# Patient Record
Sex: MALE | Race: BLACK OR AFRICAN AMERICAN | URBAN - METROPOLITAN AREA
[De-identification: names, ages, dates, MRNs, and addresses within clinical notes are randomized per-mention and may not be internally consistent; named-entity substitution may affect disease eponyms.]

---

## 2020-12-17 ENCOUNTER — HISTORICAL (OUTPATIENT)
Dept: ADMINISTRATIVE | Facility: HOSPITAL | Age: 30
End: 2020-12-17

## 2020-12-17 LAB
ABS NEUT (OLG): 7.14 X10(3)/MCL (ref 2.1–9.2)
ALBUMIN SERPL-MCNC: 4.3 GM/DL (ref 3.5–5)
ALBUMIN/GLOB SERPL: 1.2 RATIO (ref 1.1–2)
ALP SERPL-CCNC: 128 UNIT/L (ref 40–150)
ALT SERPL-CCNC: 38 UNIT/L (ref 0–55)
AST SERPL-CCNC: 27 UNIT/L (ref 5–34)
BASOPHILS # BLD AUTO: 0.03 X10(3)/MCL (ref 0–0.2)
BASOPHILS NFR BLD AUTO: 0.3 % (ref 0–0.9)
BILIRUB SERPL-MCNC: 0.4 MG/DL (ref 0.2–1.2)
BILIRUBIN DIRECT+TOT PNL SERPL-MCNC: 0.2 MG/DL (ref 0–0.5)
BILIRUBIN DIRECT+TOT PNL SERPL-MCNC: 0.2 MG/DL (ref 0–0.8)
BUN SERPL-MCNC: 13.5 MG/DL (ref 8.9–20.6)
CALCIUM SERPL-MCNC: 9.7 MG/DL (ref 8.4–10.2)
CHLORIDE SERPL-SCNC: 102 MMOL/L (ref 98–107)
CO2 SERPL-SCNC: 22 MMOL/L (ref 22–29)
CREAT SERPL-MCNC: 1.06 MG/DL (ref 0.72–1.25)
EOSINOPHIL # BLD AUTO: 0.04 X10(3)/MCL (ref 0–0.9)
EOSINOPHIL NFR BLD AUTO: 0.5 % (ref 0–6.5)
ERYTHROCYTE [DISTWIDTH] IN BLOOD BY AUTOMATED COUNT: 13.9 % (ref 11.5–17)
GLOBULIN SER-MCNC: 3.7 GM/DL (ref 2.4–3.5)
GLUCOSE SERPL-MCNC: 116 MG/DL (ref 74–100)
HCT VFR BLD AUTO: 46.2 % (ref 42–52)
HGB BLD-MCNC: 15.2 GM/DL (ref 14–18)
IMM GRANULOCYTES # BLD AUTO: 0.03 10*3/UL (ref 0–0.02)
IMM GRANULOCYTES NFR BLD AUTO: 0.3 % (ref 0–0.43)
LYMPHOCYTES # BLD AUTO: 1.13 X10(3)/MCL (ref 0.6–4.6)
LYMPHOCYTES NFR BLD AUTO: 12.7 % (ref 16.2–38.3)
MCH RBC QN AUTO: 26.9 PG (ref 27–31)
MCHC RBC AUTO-ENTMCNC: 32.9 GM/DL (ref 33–36)
MCV RBC AUTO: 81.6 FL (ref 80–94)
MONOCYTES # BLD AUTO: 0.5 X10(3)/MCL (ref 0.1–1.3)
MONOCYTES NFR BLD AUTO: 5.6 % (ref 4.7–11.3)
NEUTROPHILS # BLD AUTO: 7.14 X10(3)/MCL (ref 2.1–9.2)
NEUTROPHILS NFR BLD AUTO: 80.6 % (ref 49.1–73.4)
NRBC BLD AUTO-RTO: 0 % (ref 0–0.2)
PLATELET # BLD AUTO: 237 X10(3)/MCL (ref 130–400)
PMV BLD AUTO: 10.1 FL (ref 7.4–10.4)
POTASSIUM SERPL-SCNC: 4 MMOL/L (ref 3.5–5.1)
PROT SERPL-MCNC: 8 GM/DL (ref 6.4–8.3)
RBC # BLD AUTO: 5.66 X10(6)/MCL (ref 4.7–6.1)
SODIUM SERPL-SCNC: 136 MMOL/L (ref 136–145)
WBC # SPEC AUTO: 8.9 X10(3)/MCL (ref 4.5–11.5)

## 2022-04-30 NOTE — H&P
Date of admission 12/17/2020.    CHIEF COMPLAINT:  Testicular torsion.    HISTORY OF PRESENT ILLNESS:  This is a 30-year-old  male who is complaining of right testicular pain starting at 10:30 a.m.  He had an enlarged, high-riding testicle in the ER.  He had ultrasound showing no flow to the right testicle, consistent with torsion.  He has no previous history of such.  He was also having some difficulty urinating and some abdominal pain, which resolved.  Urology was consulted.    REVIEW OF SYSTEMS:  A 12-point review of systems is negative other than the HPI.    ALLERGIES:  No known drug allergies.    MEDICATIONS:  Medication administration record is well-documented in the chart and reviewed.    PAST MEDICAL HISTORY:  Pilonidal cyst.    PAST SURGICAL HISTORY:  None.    FAMILY HISTORY:  No  malignancy or stones.    SOCIAL HISTORY:  No tobacco, alcohol, or drugs currently.    PHYSICAL EXAMINATION:  VITAL SIGNS:  Currently his vitals are stable.  He is afebrile.   GENERAL:  Well-developed, well-nourished, in no acute distress.   CARDIOVASCULAR:  Regular rate and rhythm.   HEENT:  Normocephalic, atraumatic.   LUNGS:  Respirations unlabored.   ABDOMEN:  Obese, soft, nontender, nondistended.   :  A high-riding, enlarged, tender right testicle.  No discharge.  The phallus is normal.  Left testicle palpably normal.  No lymphadenopathy.   EXTREMITIES:  No cyanosis, clubbing, edema.   NEUROLOGIC:  Awake, alert, and oriented x4.    IMAGING:  Per HPI.    LABS:  Electrolytes are stable.  Creatinine 1.06.  Hemoglobin and hematocrit 15 and 46, white count 8.9.    ASSESSMENT:  This is a 30-year-old male with acute right testicular torsion.    PLAN:  We will take to the OR emergently for scrotal exploration, bilateral orchiopexy versus right orchiectomy.  All risks, benefits, and treatment alternatives were discussed and well-informed consent was obtained.  He will be discharged home  following.        ______________________________  Ramon SIMENTAL [place], MD NEAL/US  DD:  12/17/2020  Time:  05:56PM  DT:  12/17/2020  Time:  06:06PM  Job #:  [id number]    The H&P was reviewed, the patient was examined, and the following changes to the patients condition are noted:  ______________________________________________________________________________  ______________________________________________________________________________  ______________________________________________________________________________  [  ] No changes to the patient's condition:      ______________________________                                             ___________________  PHYSICIAN SIGNATURE                                                             DATE/TIME

## 2022-04-30 NOTE — ED PROVIDER NOTES
Patient:   Ike Gallegos            MRN: [id number]            FIN: [id number]               Age:   30 years     Sex:  Male     :  1990   Associated Diagnoses:   Right testicular torsion   Author:   Placido Logan MD      Basic Information   History source: Patient.   Arrival mode: Private vehicle.   History limitation: None.   Additional information: Chief Complaint from Nursing Triage Note : Chief Complaint   2020 12:31 CST     Chief Complaint           c/o lower right abd pain radiating to groin since 1030 AM. States has not been able to urinate since pain started. Still has his appendix  .   Provider/Visit info:   Time Seen:  Sonya Ware MD / 2020 12:53  .   History of Present Illness   The patient presents with testicular pain and groin pain.  The onset was 4  hours ago.  The course/duration of symptoms is constant.  Type of injury: none.  Location: Right testicle groin. Radiating pain: right abdomen.  The character of symptoms is throbbing.  The degree at onset was severe.  The degree at present is moderate.  The exacerbating factor is none.  The relieving factor is none.  Risk factors consist of none.  Prior episodes: none.  Therapy today: none.  Associated symptoms: none.        Review of Systems   Constitutional symptoms:  Negative except as documented in HPI.   Skin symptoms:  Negative except as documented in HPI.   Eye symptoms:  Negative except as documented in HPI.   ENMT symptoms:  Negative except as documented in HPI.   Respiratory symptoms:  Negative except as documented in HPI.   Cardiovascular symptoms:  Negative except as documented in HPI.   Gastrointestinal symptoms:  Negative except as documented in HPI.   Genitourinary symptoms:  Negative except as documented in HPI.   Musculoskeletal symptoms:  Negative except as documented in HPI.   Neurologic symptoms:  Negative except as documented in HPI.   Psychiatric symptoms:  Negative except as documented  in HPI.   Endocrine symptoms:  Negative except as documented in HPI.   Hematologic/Lymphatic symptoms:  Negative except as documented in HPI.   Allergy/immunologic symptoms:  Negative except as documented in HPI.             Additional review of systems information: All other systems reviewed and otherwise negative.      Health Status   Allergies:    Allergic Reactions (Selected)  No Known Allergies,    Allergies (1) Active Reaction  No Known Allergies None Documented  .      Past Medical/ Family/ Social History   Medical history:    Resolved  PILONIDAL CYST (685):  Resolved..   Surgical history: Negative.   Family history: Not significant.   Social history:    Social & Psychosocial Habits    Alcohol  03/22/2014 Risk Assessment: Denies Alcohol Use    12/17/2020  Use: Never    Substance Use  12/17/2020  Use: Never    Tobacco  08/27/2012 Risk Assessment: Denies Tobacco Use    12/17/2020  Use: Never (less than 100 in l    Patient Wants Consult For Cessation Counseling N/A    Abuse/Neglect  12/17/2020  SHX Any signs of abuse or neglect No    Feels unsafe at home: No    Safe place to go: Yes  .   Problem list:    Active Problems (2)  Cyst, pilonidal with abscess   Tobacco user   .      Physical Examination               Vital Signs   Vital Signs   12/17/2020 14:15 CST     Peripheral Pulse Rate     78 bpm                             SpO2                      96 %                             Oxygen Therapy            Room air                             Systolic Blood Pressure   123 mmHg                             Diastolic Blood Pressure  67 mmHg                             Mean Arterial Pressure, Cuff              86 mmHg    12/17/2020 14:06 CST     Temperature Oral          36.7 DegC                             Temperature Oral (calculated)             98.06 DegF                             Peripheral Pulse Rate     69 bpm                             Respiratory Rate          16 br/min                             SpO2                       100 %                             Oxygen Therapy            Room air                             Systolic Blood Pressure   139 mmHg                             Diastolic Blood Pressure  82 mmHg    12/17/2020 13:00 CST     Oxygen Therapy            Room air    12/17/2020 12:31 CST     Temperature Oral          36.8 DegC                             Temperature Oral (calculated)             98.24 DegF                             Peripheral Pulse Rate     69 bpm                             Respiratory Rate          20 br/min                             SpO2                      100 %                             Oxygen Therapy            Room air                             Systolic Blood Pressure   114 mmHg                             Diastolic Blood Pressure  64 mmHg  .   Measurements   12/17/2020 12:31 CST     Weight Dosing             102 kg                             Weight Measured and Calculated in Lbs     224.87 lb                             Weight Estimated          102 kg                             Height/Length Dosing      178 cm                             Height/Length Estimated   178 cm                             Body Mass Index Estimated 32.19 kg/m2  .   Basic Oxygen Information   12/17/2020 14:15 CST     SpO2                      96 %                             Oxygen Therapy            Room air    12/17/2020 14:06 CST     SpO2                      100 %                             Oxygen Therapy            Room air    12/17/2020 13:00 CST     Oxygen Therapy            Room air    12/17/2020 12:31 CST     SpO2                      100 %                             Oxygen Therapy            Room air  .   General:  Alert, no acute distress.    Skin:  Warm, dry, pink.    Head:  Normocephalic, atraumatic.    Neck:  Supple, trachea midline, no tenderness, no JVD, no carotid bruit.    Eye:  Pupils are equal, round and reactive to light, extraocular movements are intact, normal  conjunctiva, vision unchanged.    Ears, nose, mouth and throat:  Tympanic membranes clear, oral mucosa moist, no pharyngeal erythema or exudate.    Cardiovascular:  Regular rate and rhythm, No murmur, Normal peripheral perfusion, No edema.    Respiratory:  Lungs are clear to auscultation, respirations are non-labored, breath sounds are equal, Symmetrical chest wall expansion.    Chest wall:  No tenderness, No deformity.    Back:  Nontender, Normal range of motion, Normal alignment.    Musculoskeletal:  Normal ROM, normal strength, no tenderness, no swelling, no deformity.    Gastrointestinal:  Soft, Nontender, Non distended, Normal bowel sounds, No organomegaly.    Genitourinary:  Penis: Glans, shaft, normal, Testes: Right, mild, tenderness, Groin: Right, inguinal, tenderness.    Neurological:  Alert and oriented to person, place, time, and situation, No focal neurological deficit observed, CN II-XII intact, normal sensory observed, normal motor observed, normal speech observed, normal coordination observed.    Lymphatics:  No lymphadenopathy.   Psychiatric:  Cooperative, appropriate mood & affect, normal judgment.       Medical Decision Making   Results review:  Lab results : Lab View   12/17/2020 13:46 CST     Est Creat Clearance Ser   105.48 mL/min    12/17/2020 13:00 CST     Sodium Lvl                136 mmol/L                             Potassium Lvl             4.0 mmol/L                             Chloride                  102 mmol/L                             CO2                       22 mmol/L                             Calcium Lvl               9.7 mg/dL                             Glucose Lvl               116 mg/dL  HI                             BUN                       13.5 mg/dL                             Creatinine                1.06 mg/dL                             eGFR-AA                   >60  NA                             eGFR-NANCY                  >60 mL/min/1.73 m2  NA                              Bili Total                0.4 mg/dL                             Bili Direct               0.2 mg/dL                             Bili Indirect             0.20 mg/dL                             AST                       27 unit/L                             ALT                       38 unit/L                             Alk Phos                  128 unit/L                             Total Protein             8.0 gm/dL                             Albumin Lvl               4.3 gm/dL                             Globulin                  3.7 gm/dL  HI                             A/G Ratio                 1.2 ratio                             WBC                       8.9 x10(3)/mcL                             RBC                       5.66 x10(6)/mcL                             Hgb                       15.2 gm/dL                             Hct                       46.2 %                             Platelet                  237 x10(3)/mcL                             MCV                       81.6 fL                             MCH                       26.9 pg  LOW                             MCHC                      32.9 gm/dL  LOW                             RDW                       13.9 %                             MPV                       10.1 fL                             Abs Neut                  7.14 x10(3)/mcL                             Neutro Auto               80.6 %  HI                             Lymph Auto                12.7 %  LOW                             Mono Auto                 5.6 %                             Eos Auto                  0.5 %                             Abs Eos                   0.04 x10(3)/mcL                             Basophil Auto             0.3 %                             Abs Neutro                7.14 x10(3)/mcL                             Abs Lymph                 1.13 x10(3)/mcL                             Abs Mono                  0.50 x10(3)/mcL                              Abs Baso                  0.03 x10(3)/mcL                             NRBC%                     0.0 %                             IG%                       0.300 %                             IG#                       0.0300  HI  .   Radiology results:  Rad Results (ST)  < 12 hrs   Accession: [id number]  Order: US Scrotum  Report Dt/Tm: 12/17/2020 13:49  Report:   EXAMINATION:  US Scrotum     INDICATION:  Right testicular pain     Comparison: None available     Technique  Doppler sonographic ultrasound of the scrotum and its contents. Images  obtained in grayscale and color.     Comparison  No prior imaging available for comparison.     Findings  RIGHT  Right testicle measures 4.1 x 3.5 x 2.9 cm.   There is no arterial or venous Doppler flow identified within the  right testicle.  No testicular masses seen.      LEFT  Left testicle measures 4.4 x 3.2 x 3.1 cm.   There is normal and symmetric sonographic Doppler flow throughout the  left testicle.  No testicular masses seen.      There is a small hydrocele on the right.     Impression  No arterial or venous Doppler flow identified within the right  testicle concerning for torsion.     Findings given to Dr. Ware at the time of dictation.      .      Impression and Plan   Diagnosis   Right testicular torsion ([id number] N44.00)      Calls-Consults   -  12/17/2020 14:31:00 , Js GRIMALDO, Ramon CRESPO, urology, phone call, recommends to OR.    Plan   Condition: Stable.    Disposition: Admit time  12/17/2020 15:22:00, Admit to Surgery.    Counseled: Patient, Family, Regarding diagnosis, Regarding diagnostic results, Regarding treatment plan, Regarding prescription, Patient indicated understanding of instructions.

## 2022-04-30 NOTE — OP NOTE
DATE OF SURGERY:    12/17/2020    SURGEON:  Ramon Weinstein MD    PREOPERATIVE DIAGNOSIS:  Right testicular torsion.    POSTOPERATIVE DIAGNOSES:  Right testicular torsion.    PROCEDURE:  Scrotal exploration with bilateral orchiopexy.    ANESTHESIA:  General.    IV FLUIDS:  1000 cc crystalloid.    ESTIMATED BLOOD LOSS:  5 cc.    FINDINGS:  Right testicular torsion with enlarged testicle which had detorsed upon entering the tunica with reactive hydrocele and swollen testicle.  Bilateral bell clapper deformity.  Excision of appendix epididymis.  Bilateral 3 point orchiopexy.    DISPOSITION:  Taken to PACU in stable condition.    CONDITION:  Stable without problems.    BRIEF CLINICAL HISTORY:  This is a 30-year-old  male who presented with acute right onset testicular pain with ultrasound showing no flow.  He was consented for emergent scrotal exploration and orchiopexy versus orchiectomy.    PROCEDURE IN DETAIL:  After appropriate consent, the patient was taken to the operating room and placed in supine position.  All risks, benefits and treatment alternatives had been discussed and well-informed consent had been obtained.  General endotracheal anesthesia was induced.  He was prepped and draped in usual sterile fashion.  He received appropriate preoperative antibiotics.  Timeout was performed.     We began by making a midline scrotal raphe incision with a 15-blade, dissected down through the subcutaneous tissue and through the dartos.  We were then able to make a right hemiscrotal incision in the dartos and deliver the right testicle into the field.  We opened the tunica, exposing the testicle.  There was a reactive hydrocele.  We evacuated approximately 30 cc of cloudy straw-like fluid.  At this point, the testicle had appeared to have detorsed.  I am unsure if they had performed an open book maneuver within the ER itself.  It was swollen and edematous; however, it was pink and viable.  He  did have a bell clapper deformity on this side.  There was an appendix epididymis that we excised.  We then created a subcutaneous right hemiscrotal pouch.  We performed a 3-point orchiopexy with 2-0 Prolene.  At this point, we achieved good hemostasis with Bovie electrocautery.  We then closed the right hemiscrotum with a running 2-0 Vicryl suture.  We made a separate left hemiscrotal incision in similar fashion, performed a 3-point orchiopexy on the left testicle.  We did excise the appendix epididymis as well.  He did have a bell clapper deformity as well on this side.  Once we had closed down the left hemiscrotum with a running 2-0 Vicryl, we then closed the dartos on the midline with a 3-0 running Vicryl.  I closed the skin with a 4-0 Monocryl in a running horizontal mattress fashion.  I placed an antibiotic and Telfa.     He will be discharged home with pain medication.  He needs to undergo scrotal support.  He will follow up with me in two weeks for wound check at [facility].  He is to call with any questions or concerns in the interim.        ______________________________  MD VAL Saha/  DD:  12/17/2020  Time:  05:59PM  DT:  12/17/2020  Time:  06:22PM  Job #:  [id number]

## 2022-04-30 NOTE — ED PROVIDER NOTES
Patient:   Ike Gallegos            MRN: [id number]            FIN: [id number]               Age:   30 years     Sex:  Male     :  1990   Associated Diagnoses:   Right testicular torsion   Author:   Sonya Ware MD      Basic Information   History source: Patient, spouse.   Arrival mode: Private vehicle.   History limitation: None.   Additional information: Chief Complaint from Nursing Triage Note : Chief Complaint   2020 12:31 CST     Chief Complaint           c/o lower right abd pain radiating to groin since 1030 AM. States has not been able to urinate since pain started. Still has his appendix  .      History of Present Illness   The patient presents with abdominal pain and Mr. Gallegos is a 30-year-old male complaining of right lower quadrant abdominal pain which started at 10:30 AM.  He states it feels like he needs to have a bowel movement and he has been unable to urinate.  He then further explains that he also has some right testicular pain which started at the same time.  Exam performed with wife in the room revealing enlarged high riding right testicle.  Last meal:  breakfast. Mr. Gallegos is a 30-year-old female complaining of right lower quadrant abdominal pain which started at 10:30 AM.  He states it feels like he needs to have a bowel movement and he has been unable to urinate.  He then further explains that he also has some right testicular pain which started at the same time.  Exam performed with wife in the room revealing enlarged high riding right testicle.  Last meal:  breakfast. .  The onset was 2  hours ago.  The course/duration of symptoms is constant.  The character of symptoms is achy.  The degree at onset was severe.  The Location of pain at onset was right, lower and abdominal.  The degree at present is severe.  The Location of pain at present is right, lower and abdominal.  Radiating pain: Right testicle. The exacerbating factor is none.  The relieving factor  is none.  Associated symptoms: Abdominal pain, inability to urinate.        Review of Systems   Gastrointestinal symptoms:  Abdominal pain.   Genitourinary symptoms:  Testicular pain, Unable to urinate.              Additional review of systems information: All other systems reviewed and otherwise negative.      Health Status   Allergies:    Allergic Reactions (Selected)  No Known Allergies,    Allergies (1) Active Reaction  No Known Allergies None Documented  .   Medications:  (Selected)   Inpatient Medications  Ordered  Dilaudid 2 mg/mL injectable solution: 1 mg, form: Injection, IV Slow, Once, first dose 12/17/20 12:54:00 CST, stop date 12/17/20 12:54:00 CST, STAT, over at least 2--3 minutes  Zofran 2 mg/mL injectable solution: 4 mg, form: Injection, IV Push, Once-NOW, first dose 12/17/20 12:54:00 CST, stop date 12/17/20 12:54:00 CST, STAT.      Past Medical/ Family/ Social History   Medical history:    Resolved  PILONIDAL CYST (685):  Resolved., Reviewed as documented in chart.   Surgical history: Negative.   Family history: Not significant.   Social history:    Social & Psychosocial Habits    Alcohol  03/22/2014 Risk Assessment: Denies Alcohol Use    12/17/2020  Use: Never    Substance Use  12/17/2020  Use: Never    Tobacco  08/27/2012 Risk Assessment: Denies Tobacco Use    12/17/2020  Use: Never (less than 100 in l    Patient Wants Consult For Cessation Counseling N/A    Abuse/Neglect  12/17/2020  SHX Any signs of abuse or neglect No    Feels unsafe at home: No    Safe place to go: Yes  , Tobacco use: Regularly.   Problem list:    Active Problems (2)  Cyst, pilonidal with abscess   Tobacco user   , per nurse's notes.      Physical Examination               Vital Signs   Vital Signs   12/17/2020 12:31 CST     Temperature Oral          36.8 DegC                             Temperature Oral (calculated)             98.24 DegF                             Peripheral Pulse Rate     69 bpm                              Respiratory Rate          20 br/min                             SpO2                      100 %                             Oxygen Therapy            Room air                             Systolic Blood Pressure   114 mmHg                             Diastolic Blood Pressure  64 mmHg  .   Basic Oxygen Information   12/17/2020 12:31 CST     SpO2                      100 %                             Oxygen Therapy            Room air  .   General:  Alert, no acute distress.    Skin:  Warm, dry.    Eye:  Pupils are equal, round and reactive to light.   Cardiovascular:  Regular rate and rhythm.   Respiratory:  Lungs are clear to auscultation.   Gastrointestinal:  Soft, Non distended, Normal bowel sounds, No organomegaly Nontender , Tenderness: Mild, right lower quadrant, Mass: Negative.    Genitourinary:  Hig riding enlarged very tender right testicle, no penile discharge or lesion, no inguinal lymphadenopathy.   Musculoskeletal:  Ambulatory without assistance.   Neurological:  Alert and oriented to person, place, time, and situation No focal neurological deficit observed    Psychiatric:  Cooperative, appropriate mood & affect.       Medical Decision Making   Differential Diagnosis:  Rule out testicular torsion.   Documents reviewed:  Emergency department nurses' notes.   Orders  Launch Orders   Pharmacy:  Dilaudid 2 mg/mL injectable solution (Order): 1 mg, form: Injection, IV Slow, Once, first dose 12/17/2020 13:29 CST, stop date 12/17/2020 13:29 CST, STAT, over at least 2--3 minutes  .   Results review:  Lab results : [facility]   12/17/2020 13:46 CST     Est Creat Clearance Ser   105.48 mL/min    12/17/2020 13:00 CST     Sodium Lvl                136 mmol/L                             Potassium Lvl             4.0 mmol/L                             Chloride                  102 mmol/L                             CO2                       22 mmol/L                             Calcium Lvl               9.7 mg/dL                              Glucose Lvl               116 mg/dL  HI                             BUN                       13.5 mg/dL                             Creatinine                1.06 mg/dL                             eGFR-AA                   >60  NA                             eGFR-NANCY                  >60 mL/min/1.73 m2  NA                             Bili Total                0.4 mg/dL                             Bili Direct               0.2 mg/dL                             Bili Indirect             0.20 mg/dL                             AST                       27 unit/L                             ALT                       38 unit/L                             Alk Phos                  128 unit/L                             Total Protein             8.0 gm/dL                             Albumin Lvl               4.3 gm/dL                             Globulin                  3.7 gm/dL  HI                             A/G Ratio                 1.2 ratio                             WBC                       8.9 x10(3)/mcL                             RBC                       5.66 x10(6)/mcL                             Hgb                       15.2 gm/dL                             Hct                       46.2 %                             Platelet                  237 x10(3)/mcL                             MCV                       81.6 fL                             MCH                       26.9 pg  LOW                             MCHC                      32.9 gm/dL  LOW                             RDW                       13.9 %                             MPV                       10.1 fL                             Abs Neut                  7.14 x10(3)/mcL                             Neutro Auto               80.6 %  HI                             Lymph Auto                12.7 %  LOW                             Mono Auto                 5.6 %                             Eos Auto                  0.5  %                             Abs Eos                   0.04 x10(3)/mcL                             Basophil Auto             0.3 %                             Abs Neutro                7.14 x10(3)/mcL                             Abs Lymph                 1.13 x10(3)/mcL                             Abs Mono                  0.50 x10(3)/mcL                             Abs Baso                  0.03 x10(3)/mcL                             NRBC%                     0.0 %                             IG%                       0.300 %                             IG#                       0.0300  HI     Lab results : [facility]   12/17/2020 13:00 CST     WBC                       8.9 x10(3)/mcL                             RBC                       5.66 x10(6)/mcL                             Hgb                       15.2 gm/dL                             Hct                       46.2 %                             Platelet                  237 x10(3)/mcL                             MCV                       81.6 fL                             MCH                       26.9 pg  LOW                             MCHC                      32.9 gm/dL  LOW                             RDW                       13.9 %                             MPV                       10.1 fL                             Abs Neut                  7.14 x10(3)/mcL                             Neutro Auto               80.6 %  HI                             Lymph Auto                12.7 %  LOW                             Mono Auto                 5.6 %                             Eos Auto                  0.5 %                             Abs Eos                   0.04 x10(3)/mcL                             Basophil Auto             0.3 %                             Abs Neutro                7.14 x10(3)/mcL                             Abs Lymph                 1.13 x10(3)/mcL                             Abs Mono                  0.50 x10(3)/mcL                              Abs Baso                  0.03 x10(3)/mcL                             NRBC%                     0.0 %                             IG%                       0.300 %                             IG#                       0.0300  HI     .   Radiology results:  US:  Right testicular torsion.      Reexamination/ Reevaluation   Time: 12/17/2020 13:26:00 .   Notes: Stat transfer requested to [facility] for Urology transfer.  I will attempt to detorse when US complete..      Procedure   Procedure notes:   Successful testicular detorsion:  Patient was premedicated with a 2nd dose of Dilaudid 1 mg IV.  Right testicle was gently grasped and rotated 360° in an outward direction (open book) with releif of pain.  Testicle still appears enlarged and high riding, but he states he is feeling much better.       Impression and Plan   Diagnosis   Right testicular torsion ([id number] N44.00)      Calls-Consults   -  12/17/2020 13:27:00 .   Plan   Condition: Stable.    Disposition: Transfer to other location: Time: 12/17/2020 13:27:00, Facility name: [facility], Accepted by: Dr Placido Logan.    Counseled: Patient, Regarding diagnosis, Regarding diagnostic results, Regarding treatment plan, Patient indicated understanding of instructions.